# Patient Record
Sex: FEMALE | Race: AMERICAN INDIAN OR ALASKA NATIVE
[De-identification: names, ages, dates, MRNs, and addresses within clinical notes are randomized per-mention and may not be internally consistent; named-entity substitution may affect disease eponyms.]

---

## 2019-05-31 ENCOUNTER — HOSPITAL ENCOUNTER (EMERGENCY)
Dept: HOSPITAL 5 - ED | Age: 64
LOS: 1 days | Discharge: HOME | End: 2019-06-01
Payer: OTHER GOVERNMENT

## 2019-05-31 VITALS — SYSTOLIC BLOOD PRESSURE: 147 MMHG | DIASTOLIC BLOOD PRESSURE: 81 MMHG

## 2019-05-31 DIAGNOSIS — S05.01XA: Primary | ICD-10-CM

## 2019-05-31 DIAGNOSIS — H10.31: ICD-10-CM
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
## 2019-05-31 DIAGNOSIS — Z90.710: ICD-10-CM
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
## 2019-05-31 DIAGNOSIS — I10: ICD-10-CM
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
## 2019-05-31 DIAGNOSIS — M19.90: ICD-10-CM

## 2019-05-31 NOTE — EMERGENCY DEPARTMENT REPORT
ED Eye Problem HPI





- General


Chief complaint: Eye Problems


Stated complaint: RIGHT EYE PAIN


Time Seen by Provider: 05/31/19 20:58


Source: patient


Mode of arrival: Ambulatory


Limitations: No Limitations





- History of Present Illness


Initial comments: 





Pt is a 64 yo female who presents to the ED with c/o right eye pain that began 

two days ago. She has associated watery discharge, and photophobia. She does not

remember anything getting in the eye, but pt states that she has the feeling 

that something is in the eye and states she has been rubbing the eye frequently.

she denies any vision problems. she wears eye glasses but does not wear 

contacts. she has a PMHx of HTN, arthritis, and fibromyalgia. She denies any 

allergies to meds. 





- Related Data


                                Home Medications











 Medication  Instructions  Recorded  Confirmed  Last Taken


 


amLODIPine 5 mg PO DAILY 11/15/15 03/24/16 11/15/15


 


Metaxalone [Skelaxin] 400 mg PO TID 03/25/16 03/25/16 Unknown


 


PARoxetine [Paxil] 20 mg PO DAILY 03/25/16 03/25/16 Unknown








                                  Previous Rx's











 Medication  Instructions  Recorded  Last Taken  Type


 


Cyclobenzaprine [Flexeril] 10 mg PO TID PRN #20 tablet 11/15/15 Unknown Rx


 


Meloxicam 15 mg PO QDAY #30 tablet 11/15/15 Unknown Rx


 


Erythromycin [Erythromycin Ophth 0.5 inch OD QID 5 Days #1 tube 05/31/19 Unknown

 Rx





Oint]    











                                    Allergies











Allergy/AdvReac Type Severity Reaction Status Date / Time


 


No Known Allergies Allergy   Verified 05/31/19 20:51














ED Review of Systems


ROS: 


Stated complaint: RIGHT EYE PAIN


Other details as noted in HPI





Comment: All other systems reviewed and negative





ED Past Medical Hx





- Past Medical History


Previous Medical History?: Yes


Hx Hypertension: Yes


Hx Arthritis: Yes


Additional medical history: Back pain, fibromyalgia





- Surgical History


Past Surgical History?: Yes


Additional Surgical History: hysterectomy, fibroid, right knee surgery





- Social History


Smoking Status: Never Smoker


Substance Use Type: None





- Medications


Home Medications: 


                                Home Medications











 Medication  Instructions  Recorded  Confirmed  Last Taken  Type


 


Cyclobenzaprine [Flexeril] 10 mg PO TID PRN #20 tablet 11/15/15 03/24/16 Unknown

 Rx


 


Meloxicam 15 mg PO QDAY #30 tablet 11/15/15 03/24/16 Unknown Rx


 


amLODIPine 5 mg PO DAILY 11/15/15 03/24/16 11/15/15 History


 


Metaxalone [Skelaxin] 400 mg PO TID 03/25/16 03/25/16 Unknown History


 


PARoxetine [Paxil] 20 mg PO DAILY 03/25/16 03/25/16 Unknown History


 


Erythromycin [Erythromycin Ophth 0.5 inch OD QID 5 Days #1 tube 05/31/19  

Unknown Rx





Oint]     














ED Physical Exam





- General


Limitations: No Limitations


General appearance: alert, in no apparent distress





- Head


Head exam: Present: atraumatic, normocephalic





- Eye


Eye exam: Present: PERRL, EOMI, conjunctival injection (right ), other 

(fluoroscein and woods lamp: very small corneal abrasion, no foreign body, 

normal pupils BL, visual acuity: bilateral 20/40, right 20/40, left 20/30).  

Absent: scleral icterus, nystagmus, periorbital swelling, periorbital tenderness





- ENT


ENT exam: Present: mucous membranes moist





- Respiratory


Respiratory exam: Absent: respiratory distress





- Neurological Exam


Neurological exam: Present: alert, oriented X3





- Psychiatric


Psychiatric exam: Present: normal affect, normal mood





- Skin


Skin exam: Present: warm, dry, intact





ED Course


                                   Vital Signs











  05/31/19 06/01/19





  20:57 00:00


 


Temperature 98.8 F 


 


Pulse Rate 77 74


 


Respiratory 16 18





Rate  


 


Blood Pressure 147/81 


 


O2 Sat by Pulse 98 99





Oximetry  














ED Medical Decision Making





- Medical Decision Making





Pt is a 64 yo female who presents to the ED with c/o right eye pain that began 

two days ago. She has associated watery discharge, and photophobia. She does not

remember anything getting in the eye, but pt states that she has the feeling 

that something is in the eye and states she has been rubbing the eye frequently.

she denies any vision problems. she wears eye glasses but does not wear 

contacts. she has a PMHx of HTN, arthritis, and fibromyalgia. She denies any 

allergies to meds. pt has conjunctival injection of the right eye, no obvious 

drainage present. visual acuity: bilateral 20/40, right 20/40, left 20/30. woods

lamp and fluroscein used and very small corneal abrasion present, no foreign 

body present. will give pt erythromycin ointment and have pt follow up with 

ophthalmology in the next 2-3 days. follow up with PCP in the next 2-3 days. 

return to the ED for any new or worsening symptoms. 


Critical care attestation.: 


If time is entered above; I have spent that time in minutes in the direct care 

of this critically ill patient, excluding procedure time.








ED Disposition


Clinical Impression: 


Conjunctivitis


Qualifiers:


 Conjunctivitis type: acute Acute conjunctivitis type: unspecified Laterality: 

right Qualified Code(s): H10.31 - Unspecified acute conjunctivitis, right eye





Corneal abrasion


Qualifiers:


 Encounter type: initial encounter Laterality: right Qualified Code(s): S05.01XA

- Injury of conjunctiva and corneal abrasion without foreign body, right eye, 

initial encounter





Disposition: DC-01 TO HOME OR SELFCARE


Is pt being admited?: No


Does the pt Need Aspirin: No


Condition: Stable


Instructions:  Conjunctivitis (ED), Corneal Abrasion (ED)


Additional Instructions: 


Please use medication as prescribed. please follow up with a ophthalmologist in 

the next 2-3 days. avoid rubbing the eyes. return to the emergency room for any 

new or worsening symptoms. 


Prescriptions: 


Erythromycin [Erythromycin Ophth Oint] 0.5 inch OD QID 5 Days #1 tube


Referrals: 


[facility], MD [Primary Care Provider] - 2-3 Days


LIZ ACKERMAN MD [Staff Physician] - 2-3 Days


ZANE CONCEPCION MD [Staff Physician] - 2-3 Days


Time of Disposition: 23:31


Print Language: ENGLISH

## 2019-05-31 NOTE — EVENT NOTE
ED Screening Note


ED Screening Note: 


R EYE PAIN AND TEARING FOR 2 DAYS


NO TRAUMA


HAS PATCH ON EYE


NO VOMITING





RX


AMLODIPINE 


LYRICA





PMH


HTN


FIBROMYALGIA





PSH


FIBROIDS





This initial assessment/diagnostic orders/clinical plan/treatment(s) is/are 

subject to change based on patients health status, clinical progression and re-

assessment by fellow clinical providers in the ED. Further treatment and workup 

at subsequent clinical providers discretion. Patient/guardian urged not to elope

from the ED as their condition may be serious if not clinically assessed and 

managed. 





Initial orders include: 


EYE EXAM IN [facility]

## 2019-09-12 ENCOUNTER — HOSPITAL ENCOUNTER (EMERGENCY)
Dept: HOSPITAL 5 - ED | Age: 64
Discharge: HOME | End: 2019-09-12
Payer: OTHER GOVERNMENT

## 2019-09-12 VITALS — DIASTOLIC BLOOD PRESSURE: 60 MMHG | SYSTOLIC BLOOD PRESSURE: 121 MMHG

## 2019-09-12 DIAGNOSIS — Z90.710: ICD-10-CM

## 2019-09-12 DIAGNOSIS — Z79.899: ICD-10-CM

## 2019-09-12 DIAGNOSIS — M17.11: Primary | ICD-10-CM

## 2019-09-12 DIAGNOSIS — M25.461: ICD-10-CM

## 2019-09-12 DIAGNOSIS — I10: ICD-10-CM

## 2019-09-12 PROCEDURE — 73562 X-RAY EXAM OF KNEE 3: CPT

## 2019-09-12 PROCEDURE — 96372 THER/PROPH/DIAG INJ SC/IM: CPT

## 2019-09-12 PROCEDURE — 99283 EMERGENCY DEPT VISIT LOW MDM: CPT

## 2019-09-12 NOTE — EMERGENCY DEPARTMENT REPORT
ED Lower Extremity HPI





- General


Chief Complaint: Extremity Injury, Lower


Stated Complaint: RT KNEE PAIN


Time Seen by Provider: 09/12/19 14:10


Source: patient


Mode of arrival: Ambulatory


Limitations: No Limitations





- History of Present Illness


Initial Comments: 





64-year-old female past medical history of osteoarthritis, hypertension, back 

pain, fibromyalgia presents to the hospital complaining of right knee pain 2 

weeks.  Patient has a known history of arthritis of the right knee and has 

discussed possibility of needing a knee replacement with her orthopedic surgeon 

who is [facility] affiliated.  She cannot get an appointment with orthopedic for 3 

weeks and could not take the pain anymore.  This patient is having swelling.  

Patient states she has fallen secondary to pain.  Pain is moderate to severe, 

constant, worsened movement and palpation.  No reports of fever.  She is not 

currently taking any medications for pain.





- Related Data


                                Home Medications











 Medication  Instructions  Recorded  Confirmed  Last Taken


 


amLODIPine 5 mg PO DAILY 11/15/15 03/24/16 11/15/15


 


Metaxalone [Skelaxin] 400 mg PO TID 03/25/16 03/25/16 Unknown


 


PARoxetine [Paxil] 20 mg PO DAILY 03/25/16 03/25/16 Unknown








                                  Previous Rx's











 Medication  Instructions  Recorded  Last Taken  Type


 


Cyclobenzaprine [Flexeril] 10 mg PO TID PRN #20 tablet 11/15/15 Unknown Rx


 


Meloxicam 15 mg PO QDAY #30 tablet 11/15/15 Unknown Rx


 


Erythromycin [Erythromycin Ophth 0.5 inch OD QID 5 Days #1 tube 05/31/19 Unknown

 Rx





Oint]    


 


Meloxicam [Mobic] 15 mg PO DAILY #20 tablet 09/12/19 Unknown Rx


 


traMADol [Ultram 50 MG tab] 50 mg PO Q6HR PRN #20 tablet 09/12/19 Unknown Rx











                                    Allergies











Allergy/AdvReac Type Severity Reaction Status Date / Time


 


No Known Allergies Allergy   Verified 05/31/19 20:51














ED Review of Systems


ROS: 


Stated complaint: RT KNEE PAIN


Other details as noted in HPI





Comment: All other systems reviewed and negative





ED Past Medical Hx





- Past Medical History


Hx Hypertension: Yes


Hx Arthritis: Yes


Additional medical history: Back pain, fibromyalgia





- Surgical History


Additional Surgical History: hysterectomy, fibroid, right knee surgery





- Social History


Smoking Status: Never Smoker


Substance Use Type: None





- Medications


Home Medications: 


                                Home Medications











 Medication  Instructions  Recorded  Confirmed  Last Taken  Type


 


Cyclobenzaprine [Flexeril] 10 mg PO TID PRN #20 tablet 11/15/15 03/24/16 Unknown

Rx


 


Meloxicam 15 mg PO QDAY #30 tablet 11/15/15 03/24/16 Unknown Rx


 


amLODIPine 5 mg PO DAILY 11/15/15 03/24/16 11/15/15 History


 


Metaxalone [Skelaxin] 400 mg PO TID 03/25/16 03/25/16 Unknown History


 


PARoxetine [Paxil] 20 mg PO DAILY 03/25/16 03/25/16 Unknown History


 


Erythromycin [Erythromycin Ophth 0.5 inch OD QID 5 Days #1 tube 05/31/19  

Unknown Rx





Oint]     


 


Meloxicam [Mobic] 15 mg PO DAILY #20 tablet 09/12/19  Unknown Rx


 


traMADol [Ultram 50 MG tab] 50 mg PO Q6HR PRN #20 tablet 09/12/19  Unknown Rx














ED Physical Exam





- General


Limitations: No Limitations





- Other


Other exam information: 





Gen.: No acute distress


Head: Atraumatic


Eyes: Normal appearance


ENT: Moist mucous membranes


Neck: Normal appearance, no posterior midline tenderness, no meningismus


Chest: Clear to auscultation bilaterally


Cardiovascular: Regular rate and rhythm


Abdomen: Normal appearance, soft, nontender, no rebound or guarding, normal 

bowel sounds


Back: Normal appearance, nontender


Extremity: Full range of motion, mild anterior right knee swelling.  No 

tenderness at the patella, medial or lateral joint space.  Full flexion and 

extension.  No warmth or erythema.


Neuro: Alert, clear speech, no focal motor or sensory deficit


Psychiatric: Appropriate


Skin: No rash





ED Course





                                   Vital Signs











  09/12/19





  14:10


 


Temperature 98.9 F


 


Pulse Rate 84


 


Respiratory 16





Rate 


 


Blood Pressure 122/65





[Left] 


 


O2 Sat by Pulse 97





Oximetry 














ED Lower Extremity MDM





- Radiology Data


Radiology results: report reviewed








RIGHT KNEE, 3 VIEWS





INDICATION: Arthritic right knee pain.





COMPARISON: None.





IMPRESSION: Normal bone mineralization. Moderate osteoarthritic changes are 

identified in the


medial compartment and patellofemoral space. No evidence for fracture or bone 

lesion. A moderate guy


nt effusion is identified on the lateral image.





- Medical Decision Making





Patient has chronic right knee arthritis with associated joint effusion and 

pain.  Patient received 1 dose of Decadron and Toradol in the ED.  Will be 

discharged on additional pain medication and NSAIDs and outpatient follow-up 

with orthopedics encouraged.  Ace wrap applied for support.  Cane use advised.





- Differential Diagnosis


arthritis, fracture, sprain, effusion


Critical Care Time: No


Critical care attestation.: 


If time is entered above; I have spent that time in minutes in the direct care 

of this critically ill patient, excluding procedure time.








ED Disposition


Clinical Impression: 


 Arthritis of right knee, Effusion, right knee





Disposition: DC-01 TO HOME OR SELFCARE


Is pt being admited?: No


Does the pt Need Aspirin: No


Condition: Stable


Instructions:  Osteoarthritis (ED), Knee Effusion (ED)


Additional Instructions: 


Take the medication as prescribed.  Follow-up with your doctor or with the 

doctor/clinic provided.  Return if symptoms worsen as indicated by your 

discharge instructions.  Use a cane or walker to help with walking.


Prescriptions: 


Meloxicam [Mobic] 15 mg PO DAILY #20 tablet


traMADol [Ultram 50 MG tab] 50 mg PO Q6HR PRN #20 tablet


 PRN Reason: Pain


Referrals: 


ELIZABETH RIVER MD [Staff Physician] - 3-5 Days


your, orthopedic doctor [Other] - 3-5 Days


Time of Disposition: 15:49

## 2019-09-12 NOTE — EMERGENCY DEPARTMENT REPORT
Blank Doc





- Documentation


Documentation: 





64-year-old female that presents with right knee pain.





This initial assessment/diagnostic orders/clinical plan/treatment(s) is/are 

subject to change based on patient's health status, clinical progression and re-

assessment by fellow clinical providers in the ED.  Further treatment and workup

at subsequent clinical providers discretion.  Patient/guardians urged not to 

elope from the ED as their condition may be serious if not clinically assessed 

and managed.  Initial orders include:


1- Patient sent to [facility] for further evaluation and treatment


2- xrays

## 2019-09-12 NOTE — XRAY REPORT
RIGHT KNEE, 3 VIEWS



INDICATION:  Arthritic right knee pain. 



COMPARISON: None.



IMPRESSION:  Normal bone mineralization.  Moderate osteoarthritic changes are identified in the media
l compartment and patellofemoral space. No evidence for fracture or bone lesion. A moderate joint eff
usion is identified on the lateral image.



Signer Name: Easton Nelson Jr, MD 

Signed: 9/12/2019 3:05 PM

 Workstation Name: [id number]

## 2020-01-28 ENCOUNTER — HOSPITAL ENCOUNTER (EMERGENCY)
Dept: HOSPITAL 5 - ED | Age: 65
Discharge: LEFT BEFORE DISCHARGE | End: 2020-01-28
Payer: OTHER GOVERNMENT

## 2020-01-28 DIAGNOSIS — Z53.21: ICD-10-CM

## 2020-01-28 DIAGNOSIS — R42: Primary | ICD-10-CM

## 2020-06-18 ENCOUNTER — OFFICE VISIT (OUTPATIENT)
Dept: URBAN - METROPOLITAN AREA CLINIC 109 | Facility: CLINIC | Age: 65
End: 2020-06-18

## 2020-06-22 ENCOUNTER — OFFICE VISIT (OUTPATIENT)
Dept: URBAN - METROPOLITAN AREA TELEHEALTH 2 | Facility: TELEHEALTH | Age: 65
End: 2020-06-22

## 2020-07-16 ENCOUNTER — OFFICE VISIT (OUTPATIENT)
Dept: URBAN - METROPOLITAN AREA TELEHEALTH 2 | Facility: TELEHEALTH | Age: 65
End: 2020-07-16

## 2021-01-28 ENCOUNTER — OFFICE VISIT (OUTPATIENT)
Dept: URBAN - METROPOLITAN AREA CLINIC 17 | Facility: CLINIC | Age: 66
End: 2021-01-28

## 2021-03-29 ENCOUNTER — DASHBOARD ENCOUNTERS (OUTPATIENT)
Age: 66
End: 2021-03-29

## 2021-03-30 ENCOUNTER — OFFICE VISIT (OUTPATIENT)
Dept: URBAN - METROPOLITAN AREA CLINIC 17 | Facility: CLINIC | Age: 66
End: 2021-03-30

## 2021-06-04 ENCOUNTER — OFFICE VISIT (OUTPATIENT)
Dept: URBAN - METROPOLITAN AREA SURGERY CENTER 16 | Facility: SURGERY CENTER | Age: 66
End: 2021-06-04

## 2021-08-16 PROBLEM — 428283002 HISTORY OF POLYP OF COLON: Status: ACTIVE | Noted: 2021-04-24

## 2021-09-13 ENCOUNTER — OFFICE VISIT (OUTPATIENT)
Dept: URBAN - METROPOLITAN AREA SURGERY CENTER 16 | Facility: SURGERY CENTER | Age: 66
End: 2021-09-13

## 2021-09-15 ENCOUNTER — LAB OUTSIDE AN ENCOUNTER (OUTPATIENT)
Dept: URBAN - METROPOLITAN AREA CLINIC 92 | Facility: CLINIC | Age: 66
End: 2021-09-15

## 2021-09-15 ENCOUNTER — OFFICE VISIT (OUTPATIENT)
Dept: URBAN - METROPOLITAN AREA SURGERY CENTER 16 | Facility: SURGERY CENTER | Age: 66
End: 2021-09-15
Payer: MEDICARE

## 2021-09-15 ENCOUNTER — TELEPHONE ENCOUNTER (OUTPATIENT)
Dept: URBAN - METROPOLITAN AREA CLINIC 92 | Facility: CLINIC | Age: 66
End: 2021-09-15

## 2021-09-15 DIAGNOSIS — Z86.010 ADENOMAS PERSONAL HISTORY OF COLONIC POLYPS: ICD-10-CM

## 2021-09-15 DIAGNOSIS — Z53.8 FAILED ATTEMPTED SURGICAL PROCEDURE: ICD-10-CM

## 2021-09-15 DIAGNOSIS — K63.89 BACTERIAL OVERGROWTH SYNDROME: ICD-10-CM

## 2021-09-15 PROCEDURE — 45380 COLONOSCOPY AND BIOPSY: CPT | Performed by: INTERNAL MEDICINE

## 2021-09-15 PROCEDURE — G8907 PT DOC NO EVENTS ON DISCHARG: HCPCS | Performed by: INTERNAL MEDICINE

## 2025-02-24 ENCOUNTER — OFFICE VISIT (OUTPATIENT)
Dept: URBAN - METROPOLITAN AREA CLINIC 17 | Facility: CLINIC | Age: 70
End: 2025-02-24